# Patient Record
Sex: FEMALE | Employment: UNEMPLOYED | URBAN - METROPOLITAN AREA
[De-identification: names, ages, dates, MRNs, and addresses within clinical notes are randomized per-mention and may not be internally consistent; named-entity substitution may affect disease eponyms.]

---

## 2019-05-27 ENCOUNTER — HOSPITAL ENCOUNTER (INPATIENT)
Facility: CLINIC | Age: 44
LOS: 1 days | Discharge: LEFT BEFORE DISCHARGE | DRG: 607 | End: 2019-05-28
Attending: EMERGENCY MEDICINE | Admitting: INTERNAL MEDICINE
Payer: COMMERCIAL

## 2019-05-27 DIAGNOSIS — D64.9 ANEMIA, UNSPECIFIED TYPE: ICD-10-CM

## 2019-05-27 DIAGNOSIS — L03.315 CELLULITIS OF PERINEUM: ICD-10-CM

## 2019-05-27 DIAGNOSIS — L03.314 CELLULITIS OF GROIN: Primary | ICD-10-CM

## 2019-05-27 DIAGNOSIS — R51.9 ACUTE NONINTRACTABLE HEADACHE, UNSPECIFIED HEADACHE TYPE: ICD-10-CM

## 2019-05-27 LAB
ALBUMIN SERPL-MCNC: 3.6 G/DL (ref 3.4–5)
ALP SERPL-CCNC: 67 U/L (ref 40–150)
ALT SERPL W P-5'-P-CCNC: 52 U/L (ref 0–50)
ANION GAP SERPL CALCULATED.3IONS-SCNC: 5 MMOL/L (ref 3–14)
AST SERPL W P-5'-P-CCNC: 25 U/L (ref 0–45)
BILIRUB SERPL-MCNC: 0.2 MG/DL (ref 0.2–1.3)
BUN SERPL-MCNC: 10 MG/DL (ref 7–30)
CALCIUM SERPL-MCNC: 8.5 MG/DL (ref 8.5–10.1)
CHLORIDE SERPL-SCNC: 107 MMOL/L (ref 94–109)
CO2 SERPL-SCNC: 28 MMOL/L (ref 20–32)
CREAT SERPL-MCNC: 0.59 MG/DL (ref 0.52–1.04)
GFR SERPL CREATININE-BSD FRML MDRD: >90 ML/MIN/{1.73_M2}
GLUCOSE SERPL-MCNC: 71 MG/DL (ref 70–99)
LACTATE BLD-SCNC: 1 MMOL/L (ref 0.7–2)
POTASSIUM SERPL-SCNC: 4 MMOL/L (ref 3.4–5.3)
PROT SERPL-MCNC: 6.8 G/DL (ref 6.8–8.8)
SODIUM SERPL-SCNC: 140 MMOL/L (ref 133–144)

## 2019-05-27 PROCEDURE — 25000128 H RX IP 250 OP 636: Performed by: EMERGENCY MEDICINE

## 2019-05-27 PROCEDURE — 99285 EMERGENCY DEPT VISIT HI MDM: CPT | Mod: 25

## 2019-05-27 PROCEDURE — 96361 HYDRATE IV INFUSION ADD-ON: CPT

## 2019-05-27 PROCEDURE — 80053 COMPREHEN METABOLIC PANEL: CPT | Performed by: EMERGENCY MEDICINE

## 2019-05-27 PROCEDURE — 96375 TX/PRO/DX INJ NEW DRUG ADDON: CPT

## 2019-05-27 PROCEDURE — 84145 PROCALCITONIN (PCT): CPT | Performed by: EMERGENCY MEDICINE

## 2019-05-27 PROCEDURE — 51798 US URINE CAPACITY MEASURE: CPT

## 2019-05-27 PROCEDURE — 83605 ASSAY OF LACTIC ACID: CPT | Performed by: EMERGENCY MEDICINE

## 2019-05-27 PROCEDURE — 85025 COMPLETE CBC W/AUTO DIFF WBC: CPT | Performed by: EMERGENCY MEDICINE

## 2019-05-27 RX ORDER — PIPERACILLIN SODIUM, TAZOBACTAM SODIUM 4; .5 G/20ML; G/20ML
4.5 INJECTION, POWDER, LYOPHILIZED, FOR SOLUTION INTRAVENOUS ONCE
Status: COMPLETED | OUTPATIENT
Start: 2019-05-27 | End: 2019-05-28

## 2019-05-27 RX ORDER — MORPHINE SULFATE 4 MG/ML
4 INJECTION, SOLUTION INTRAMUSCULAR; INTRAVENOUS ONCE
Status: COMPLETED | OUTPATIENT
Start: 2019-05-27 | End: 2019-05-27

## 2019-05-27 RX ORDER — SODIUM CHLORIDE 9 MG/ML
1000 INJECTION, SOLUTION INTRAVENOUS CONTINUOUS
Status: DISCONTINUED | OUTPATIENT
Start: 2019-05-27 | End: 2019-05-28

## 2019-05-27 RX ADMIN — MORPHINE SULFATE 4 MG: 4 INJECTION INTRAVENOUS at 23:29

## 2019-05-27 RX ADMIN — SODIUM CHLORIDE 1000 ML: 9 INJECTION, SOLUTION INTRAVENOUS at 23:09

## 2019-05-27 ASSESSMENT — ENCOUNTER SYMPTOMS
CHILLS: 0
VOMITING: 0
FEVER: 0
ABDOMINAL PAIN: 0
COLOR CHANGE: 1
MYALGIAS: 0
NAUSEA: 0

## 2019-05-27 ASSESSMENT — MIFFLIN-ST. JEOR: SCORE: 1227.41

## 2019-05-27 NOTE — LETTER
May 28, 2019      To Whom It May Concern:      Thuy Olvera was seen in our Emergency Department today, 05/28/19. She is admitted to the hospital for further evaluation and treatment of her condition. Please excuse her from her missed court date.    Sincerely,        Jaziel Bravo MD

## 2019-05-28 ENCOUNTER — APPOINTMENT (OUTPATIENT)
Dept: CT IMAGING | Facility: CLINIC | Age: 44
DRG: 607 | End: 2019-05-28
Attending: EMERGENCY MEDICINE
Payer: COMMERCIAL

## 2019-05-28 VITALS
RESPIRATION RATE: 16 BRPM | WEIGHT: 126 LBS | HEART RATE: 69 BPM | BODY MASS INDEX: 20.99 KG/M2 | OXYGEN SATURATION: 100 % | HEIGHT: 65 IN | TEMPERATURE: 96.1 F | DIASTOLIC BLOOD PRESSURE: 58 MMHG | SYSTOLIC BLOOD PRESSURE: 101 MMHG

## 2019-05-28 PROBLEM — L03.90 CELLULITIS: Status: ACTIVE | Noted: 2019-05-28

## 2019-05-28 LAB
ALBUMIN UR-MCNC: NEGATIVE MG/DL
APPEARANCE UR: CLEAR
BASOPHILS # BLD AUTO: 0 10E9/L (ref 0–0.2)
BASOPHILS NFR BLD AUTO: 0 %
BILIRUB UR QL STRIP: NEGATIVE
COLOR UR AUTO: NORMAL
DIFFERENTIAL METHOD BLD: ABNORMAL
ELLIPTOCYTES BLD QL SMEAR: SLIGHT
EOSINOPHIL # BLD AUTO: 0.2 10E9/L (ref 0–0.7)
EOSINOPHIL NFR BLD AUTO: 3 %
ERYTHROCYTE [DISTWIDTH] IN BLOOD BY AUTOMATED COUNT: ABNORMAL % (ref 10–15)
GLUCOSE UR STRIP-MCNC: NEGATIVE MG/DL
HCT VFR BLD AUTO: 28.7 % (ref 35–47)
HGB BLD-MCNC: 8.1 G/DL (ref 11.7–15.7)
HGB UR QL STRIP: NEGATIVE
KETONES UR STRIP-MCNC: NEGATIVE MG/DL
LEUKOCYTE ESTERASE UR QL STRIP: NEGATIVE
LYMPHOCYTES # BLD AUTO: 1.5 10E9/L (ref 0.8–5.3)
LYMPHOCYTES NFR BLD AUTO: 22 %
MACROCYTES BLD QL SMEAR: PRESENT
MCH RBC QN AUTO: 17.5 PG (ref 26.5–33)
MCHC RBC AUTO-ENTMCNC: 28.2 G/DL (ref 31.5–36.5)
MCV RBC AUTO: 62 FL (ref 78–100)
MONOCYTES # BLD AUTO: 0.5 10E9/L (ref 0–1.3)
MONOCYTES NFR BLD AUTO: 7 %
NEUTROPHILS # BLD AUTO: 4.6 10E9/L (ref 1.6–8.3)
NEUTROPHILS NFR BLD AUTO: 68 %
NITRATE UR QL: NEGATIVE
PH UR STRIP: 6.5 PH (ref 5–7)
PLATELET # BLD AUTO: 339 10E9/L (ref 150–450)
PLATELET # BLD EST: ABNORMAL 10*3/UL
PROCALCITONIN SERPL-MCNC: <0.05 NG/ML
RBC # BLD AUTO: 4.63 10E12/L (ref 3.8–5.2)
RBC #/AREA URNS AUTO: <1 /HPF (ref 0–2)
RBC INCLUSIONS BLD: SLIGHT
SOURCE: NORMAL
SP GR UR STRIP: 1 (ref 1–1.03)
SQUAMOUS #/AREA URNS AUTO: <1 /HPF (ref 0–1)
TARGETS BLD QL SMEAR: ABNORMAL
UROBILINOGEN UR STRIP-MCNC: NORMAL MG/DL (ref 0–2)
WBC # BLD AUTO: 6.8 10E9/L (ref 4–11)
WBC #/AREA URNS AUTO: <1 /HPF (ref 0–5)

## 2019-05-28 PROCEDURE — 25000128 H RX IP 250 OP 636: Performed by: EMERGENCY MEDICINE

## 2019-05-28 PROCEDURE — 25000132 ZZH RX MED GY IP 250 OP 250 PS 637: Performed by: INTERNAL MEDICINE

## 2019-05-28 PROCEDURE — 96365 THER/PROPH/DIAG IV INF INIT: CPT | Mod: 59

## 2019-05-28 PROCEDURE — 99207 ZZC APP CREDIT; MD BILLING SHARED VISIT: CPT | Performed by: INTERNAL MEDICINE

## 2019-05-28 PROCEDURE — 25000128 H RX IP 250 OP 636: Performed by: INTERNAL MEDICINE

## 2019-05-28 PROCEDURE — 81001 URINALYSIS AUTO W/SCOPE: CPT | Performed by: EMERGENCY MEDICINE

## 2019-05-28 PROCEDURE — 25000132 ZZH RX MED GY IP 250 OP 250 PS 637: Performed by: EMERGENCY MEDICINE

## 2019-05-28 PROCEDURE — 12000000 ZZH R&B MED SURG/OB

## 2019-05-28 PROCEDURE — 96375 TX/PRO/DX INJ NEW DRUG ADDON: CPT

## 2019-05-28 PROCEDURE — 25800030 ZZH RX IP 258 OP 636: Performed by: INTERNAL MEDICINE

## 2019-05-28 PROCEDURE — 74177 CT ABD & PELVIS W/CONTRAST: CPT

## 2019-05-28 PROCEDURE — 99207 ZZC CDG-CODE CATEGORY CHANGED: CPT | Performed by: INTERNAL MEDICINE

## 2019-05-28 PROCEDURE — 99235 HOSP IP/OBS SAME DATE MOD 70: CPT | Mod: AI | Performed by: INTERNAL MEDICINE

## 2019-05-28 PROCEDURE — G0463 HOSPITAL OUTPT CLINIC VISIT: HCPCS

## 2019-05-28 PROCEDURE — 25000125 ZZHC RX 250: Performed by: EMERGENCY MEDICINE

## 2019-05-28 RX ORDER — ESCITALOPRAM OXALATE 10 MG/1
10 TABLET ORAL 2 TIMES DAILY
Status: DISCONTINUED | OUTPATIENT
Start: 2019-05-28 | End: 2019-05-28 | Stop reason: HOSPADM

## 2019-05-28 RX ORDER — CEPHALEXIN 500 MG/1
500 CAPSULE ORAL EVERY 6 HOURS
Qty: 12 CAPSULE | Refills: 0 | Status: SHIPPED | OUTPATIENT
Start: 2019-05-28 | End: 2019-05-31

## 2019-05-28 RX ORDER — IBUPROFEN 200 MG
CAPSULE ORAL 2 TIMES DAILY PRN
Status: ON HOLD | COMMUNITY
End: 2019-05-28

## 2019-05-28 RX ORDER — ACETAMINOPHEN 325 MG/1
325-650 TABLET ORAL EVERY 6 HOURS PRN
COMMUNITY

## 2019-05-28 RX ORDER — CEFAZOLIN SODIUM 2 G/100ML
2 INJECTION, SOLUTION INTRAVENOUS EVERY 8 HOURS
Status: DISCONTINUED | OUTPATIENT
Start: 2019-05-28 | End: 2019-05-28

## 2019-05-28 RX ORDER — POLYETHYLENE GLYCOL 3350 17 G/17G
17 POWDER, FOR SOLUTION ORAL DAILY PRN
Status: DISCONTINUED | OUTPATIENT
Start: 2019-05-28 | End: 2019-05-28 | Stop reason: HOSPADM

## 2019-05-28 RX ORDER — ACETAMINOPHEN 325 MG/1
650 TABLET ORAL EVERY 4 HOURS PRN
Status: DISCONTINUED | OUTPATIENT
Start: 2019-05-28 | End: 2019-05-28 | Stop reason: HOSPADM

## 2019-05-28 RX ORDER — LANOLIN ALCOHOL/MO/W.PET/CERES
400 CREAM (GRAM) TOPICAL DAILY
Status: DISCONTINUED | OUTPATIENT
Start: 2019-05-28 | End: 2019-05-28 | Stop reason: HOSPADM

## 2019-05-28 RX ORDER — GABAPENTIN 300 MG/1
300 CAPSULE ORAL 2 TIMES DAILY
COMMUNITY

## 2019-05-28 RX ORDER — GABAPENTIN 300 MG/1
300 CAPSULE ORAL 2 TIMES DAILY
Status: DISCONTINUED | OUTPATIENT
Start: 2019-05-28 | End: 2019-05-28 | Stop reason: HOSPADM

## 2019-05-28 RX ORDER — BENZOCAINE/MENTHOL 6 MG-10 MG
LOZENGE MUCOUS MEMBRANE 2 TIMES DAILY
Qty: 120 G | Refills: 0 | Status: SHIPPED | OUTPATIENT
Start: 2019-05-28

## 2019-05-28 RX ORDER — ZONISAMIDE 100 MG/1
100 CAPSULE ORAL 2 TIMES DAILY
Status: DISCONTINUED | OUTPATIENT
Start: 2019-05-28 | End: 2019-05-28 | Stop reason: HOSPADM

## 2019-05-28 RX ORDER — CEPHALEXIN 500 MG/1
500 CAPSULE ORAL EVERY 6 HOURS SCHEDULED
Status: DISCONTINUED | OUTPATIENT
Start: 2019-05-28 | End: 2019-05-28 | Stop reason: HOSPADM

## 2019-05-28 RX ORDER — METOCLOPRAMIDE HYDROCHLORIDE 5 MG/ML
10 INJECTION INTRAMUSCULAR; INTRAVENOUS ONCE
Status: COMPLETED | OUTPATIENT
Start: 2019-05-28 | End: 2019-05-28

## 2019-05-28 RX ORDER — IOPAMIDOL 755 MG/ML
64 INJECTION, SOLUTION INTRAVASCULAR ONCE
Status: COMPLETED | OUTPATIENT
Start: 2019-05-28 | End: 2019-05-28

## 2019-05-28 RX ORDER — DIPHENHYDRAMINE HYDROCHLORIDE 50 MG/ML
25 INJECTION INTRAMUSCULAR; INTRAVENOUS ONCE
Status: COMPLETED | OUTPATIENT
Start: 2019-05-28 | End: 2019-05-28

## 2019-05-28 RX ORDER — ROSUVASTATIN CALCIUM 5 MG/1
5 TABLET, COATED ORAL AT BEDTIME
COMMUNITY

## 2019-05-28 RX ORDER — RIZATRIPTAN BENZOATE 5 MG/1
5 TABLET ORAL
COMMUNITY

## 2019-05-28 RX ORDER — ZONISAMIDE 100 MG/1
CAPSULE ORAL 2 TIMES DAILY
COMMUNITY

## 2019-05-28 RX ORDER — ONDANSETRON 2 MG/ML
4 INJECTION INTRAMUSCULAR; INTRAVENOUS EVERY 6 HOURS PRN
Status: DISCONTINUED | OUTPATIENT
Start: 2019-05-28 | End: 2019-05-28 | Stop reason: HOSPADM

## 2019-05-28 RX ORDER — POLYETHYLENE GLYCOL 3350 17 G/17G
1 POWDER, FOR SOLUTION ORAL 2 TIMES DAILY PRN
COMMUNITY

## 2019-05-28 RX ORDER — CYANOCOBALAMIN 1000 UG/ML
1 INJECTION, SOLUTION INTRAMUSCULAR; SUBCUTANEOUS
COMMUNITY

## 2019-05-28 RX ORDER — HYDROCODONE BITARTRATE AND ACETAMINOPHEN 5; 325 MG/1; MG/1
2 TABLET ORAL ONCE
Status: COMPLETED | OUTPATIENT
Start: 2019-05-28 | End: 2019-05-28

## 2019-05-28 RX ORDER — TRAZODONE HYDROCHLORIDE 50 MG/1
150 TABLET, FILM COATED ORAL AT BEDTIME
Status: CANCELLED | OUTPATIENT
Start: 2019-05-28

## 2019-05-28 RX ORDER — LAMOTRIGINE 25 MG/1
25 TABLET ORAL 2 TIMES DAILY
Status: CANCELLED | OUTPATIENT
Start: 2019-05-28

## 2019-05-28 RX ORDER — BISACODYL 10 MG
10 SUPPOSITORY, RECTAL RECTAL DAILY PRN
Status: DISCONTINUED | OUTPATIENT
Start: 2019-05-28 | End: 2019-05-28 | Stop reason: HOSPADM

## 2019-05-28 RX ORDER — LACTOBACILLUS RHAMNOSUS GG 10B CELL
1 CAPSULE ORAL 2 TIMES DAILY
Status: DISCONTINUED | OUTPATIENT
Start: 2019-05-28 | End: 2019-05-28 | Stop reason: HOSPADM

## 2019-05-28 RX ORDER — ZOLPIDEM TARTRATE 5 MG/1
5 TABLET ORAL
Status: CANCELLED | OUTPATIENT
Start: 2019-05-28

## 2019-05-28 RX ORDER — ESCITALOPRAM OXALATE 10 MG/1
10 TABLET ORAL 2 TIMES DAILY
COMMUNITY

## 2019-05-28 RX ORDER — QUETIAPINE FUMARATE 25 MG/1
6.25 TABLET, FILM COATED ORAL 2 TIMES DAILY
COMMUNITY

## 2019-05-28 RX ORDER — CLOTRIMAZOLE 1 %
CREAM (GRAM) TOPICAL 2 TIMES DAILY
Status: DISCONTINUED | OUTPATIENT
Start: 2019-05-28 | End: 2019-05-28 | Stop reason: HOSPADM

## 2019-05-28 RX ORDER — POLYETHYLENE GLYCOL 3350 17 G/17G
17 POWDER, FOR SOLUTION ORAL DAILY
Status: DISCONTINUED | OUTPATIENT
Start: 2019-05-28 | End: 2019-05-28 | Stop reason: HOSPADM

## 2019-05-28 RX ORDER — KETOCONAZOLE 20 MG/ML
SHAMPOO TOPICAL
COMMUNITY

## 2019-05-28 RX ORDER — PROCHLORPERAZINE 25 MG
25 SUPPOSITORY, RECTAL RECTAL EVERY 12 HOURS PRN
Status: DISCONTINUED | OUTPATIENT
Start: 2019-05-28 | End: 2019-05-28

## 2019-05-28 RX ORDER — LANOLIN ALCOHOL/MO/W.PET/CERES
400 CREAM (GRAM) TOPICAL DAILY
COMMUNITY

## 2019-05-28 RX ORDER — ONDANSETRON 4 MG/1
4 TABLET, ORALLY DISINTEGRATING ORAL EVERY 6 HOURS PRN
Status: DISCONTINUED | OUTPATIENT
Start: 2019-05-28 | End: 2019-05-28 | Stop reason: HOSPADM

## 2019-05-28 RX ORDER — RIZATRIPTAN BENZOATE 5 MG/1
5 TABLET ORAL
Status: DISCONTINUED | OUTPATIENT
Start: 2019-05-28 | End: 2019-05-28 | Stop reason: HOSPADM

## 2019-05-28 RX ORDER — AMOXICILLIN 250 MG
1-2 CAPSULE ORAL 2 TIMES DAILY
Status: DISCONTINUED | OUTPATIENT
Start: 2019-05-28 | End: 2019-05-28 | Stop reason: HOSPADM

## 2019-05-28 RX ORDER — SODIUM CHLORIDE 9 MG/ML
INJECTION, SOLUTION INTRAVENOUS CONTINUOUS
Status: DISCONTINUED | OUTPATIENT
Start: 2019-05-28 | End: 2019-05-28 | Stop reason: HOSPADM

## 2019-05-28 RX ORDER — QUETIAPINE 200 MG/1
200 TABLET, FILM COATED, EXTENDED RELEASE ORAL AT BEDTIME
COMMUNITY

## 2019-05-28 RX ORDER — CARBIDOPA AND LEVODOPA 25; 100 MG/1; MG/1
0.5 TABLET ORAL 2 TIMES DAILY
COMMUNITY

## 2019-05-28 RX ORDER — QUETIAPINE 200 MG/1
200 TABLET, FILM COATED, EXTENDED RELEASE ORAL AT BEDTIME
Status: DISCONTINUED | OUTPATIENT
Start: 2019-05-28 | End: 2019-05-28 | Stop reason: HOSPADM

## 2019-05-28 RX ORDER — GABAPENTIN 400 MG/1
400 CAPSULE ORAL
Status: CANCELLED | OUTPATIENT
Start: 2019-05-28

## 2019-05-28 RX ORDER — PROCHLORPERAZINE MALEATE 10 MG
10 TABLET ORAL EVERY 6 HOURS PRN
Status: DISCONTINUED | OUTPATIENT
Start: 2019-05-28 | End: 2019-05-28

## 2019-05-28 RX ORDER — HYDROCODONE BITARTRATE AND ACETAMINOPHEN 5; 325 MG/1; MG/1
1 TABLET ORAL EVERY 6 HOURS PRN
Status: DISCONTINUED | OUTPATIENT
Start: 2019-05-28 | End: 2019-05-28 | Stop reason: HOSPADM

## 2019-05-28 RX ORDER — RIZATRIPTAN BENZOATE 5 MG/1
5 TABLET ORAL
Status: DISCONTINUED | OUTPATIENT
Start: 2019-05-28 | End: 2019-05-28 | Stop reason: DRUGHIGH

## 2019-05-28 RX ORDER — NALOXONE HYDROCHLORIDE 0.4 MG/ML
.1-.4 INJECTION, SOLUTION INTRAMUSCULAR; INTRAVENOUS; SUBCUTANEOUS
Status: DISCONTINUED | OUTPATIENT
Start: 2019-05-28 | End: 2019-05-28 | Stop reason: HOSPADM

## 2019-05-28 RX ORDER — GABAPENTIN 600 MG/1
1200 TABLET ORAL 2 TIMES DAILY
Status: CANCELLED | OUTPATIENT
Start: 2019-05-28

## 2019-05-28 RX ORDER — ROSUVASTATIN CALCIUM 5 MG/1
5 TABLET, COATED ORAL AT BEDTIME
Status: DISCONTINUED | OUTPATIENT
Start: 2019-05-28 | End: 2019-05-28 | Stop reason: HOSPADM

## 2019-05-28 RX ORDER — CLOTRIMAZOLE 1 %
CREAM (GRAM) TOPICAL 2 TIMES DAILY
Qty: 60 G | Refills: 0 | Status: SHIPPED | OUTPATIENT
Start: 2019-05-28

## 2019-05-28 RX ADMIN — SODIUM CHLORIDE 60 ML: 9 INJECTION, SOLUTION INTRAVENOUS at 00:16

## 2019-05-28 RX ADMIN — SODIUM CHLORIDE: 9 INJECTION, SOLUTION INTRAVENOUS at 03:40

## 2019-05-28 RX ADMIN — ESCITALOPRAM OXALATE 10 MG: 10 TABLET ORAL at 10:08

## 2019-05-28 RX ADMIN — METFORMIN HYDROCHLORIDE 500 MG: 500 TABLET, FILM COATED ORAL at 09:55

## 2019-05-28 RX ADMIN — Medication 6.25 MG: at 12:36

## 2019-05-28 RX ADMIN — IOPAMIDOL 64 ML: 755 INJECTION, SOLUTION INTRAVENOUS at 00:16

## 2019-05-28 RX ADMIN — POLYETHYLENE GLYCOL 3350 17 G: 17 POWDER, FOR SOLUTION ORAL at 03:39

## 2019-05-28 RX ADMIN — DIPHENHYDRAMINE HYDROCHLORIDE 25 MG: 50 INJECTION INTRAMUSCULAR; INTRAVENOUS at 02:27

## 2019-05-28 RX ADMIN — Medication 10 MG: at 03:38

## 2019-05-28 RX ADMIN — Medication 6.25 MG: at 10:07

## 2019-05-28 RX ADMIN — ZONISAMIDE 100 MG: 100 CAPSULE ORAL at 10:08

## 2019-05-28 RX ADMIN — PIPERACILLIN SODIUM,TAZOBACTAM SODIUM 4.5 G: 4; .5 INJECTION, POWDER, FOR SOLUTION INTRAVENOUS at 01:15

## 2019-05-28 RX ADMIN — Medication 400 MCG: at 10:08

## 2019-05-28 RX ADMIN — HYDROCODONE BITARTRATE AND ACETAMINOPHEN 2 TABLET: 5; 325 TABLET ORAL at 02:26

## 2019-05-28 RX ADMIN — CARBIDOPA AND LEVODOPA 1 HALF-TAB: 25; 100 TABLET ORAL at 11:10

## 2019-05-28 RX ADMIN — ACETAMINOPHEN 650 MG: 325 TABLET, FILM COATED ORAL at 07:59

## 2019-05-28 RX ADMIN — GABAPENTIN 300 MG: 300 CAPSULE ORAL at 09:55

## 2019-05-28 RX ADMIN — CEPHALEXIN 500 MG: 500 CAPSULE ORAL at 12:36

## 2019-05-28 RX ADMIN — CEFAZOLIN SODIUM 2 G: 2 INJECTION, SOLUTION INTRAVENOUS at 03:40

## 2019-05-28 RX ADMIN — RIZATRIPTAN 5 MG: 5 TABLET, FILM COATED ORAL at 11:14

## 2019-05-28 RX ADMIN — METOCLOPRAMIDE 10 MG: 5 INJECTION, SOLUTION INTRAMUSCULAR; INTRAVENOUS at 02:27

## 2019-05-28 RX ADMIN — Medication 1 CAPSULE: at 11:10

## 2019-05-28 ASSESSMENT — ACTIVITIES OF DAILY LIVING (ADL)
ADLS_ACUITY_SCORE: 13
ADLS_ACUITY_SCORE: 11
ADLS_ACUITY_SCORE: 14

## 2019-05-28 NOTE — PROGRESS NOTES
RECEIVING UNIT ED HANDOFF REVIEW    ED Nurse Handoff Report was reviewed by: Caty Aguilar on May 28, 2019 at 2:34 AM

## 2019-05-28 NOTE — PHARMACY-ADMISSION MEDICATION HISTORY
Admission medication history interview status for the 5/27/2019  admission is complete. See EPIC admission navigator for prior to admission medications     Medication history source reliability:Moderate    Actions taken by pharmacist (provider contacted, etc):interviewed patient, checked Care Everywhere, called [facility]--where pt states she gets her meds (but they only had diabetic testing supplies on their records)     Additional medication history information not noted on PTA med list :maxalt and zonisamide doses can't be confirmed, but best esitmate is 5mg maxalt and 100mg bid zonisamide    Medication reconciliation/reorder completed by provider prior to medication history? No    Time spent in this activity: 30 minutes    Prior to Admission medications    Medication Sig Last Dose Taking? Auth Provider   acetaminophen (TYLENOL) 325 MG tablet Take 325-650 mg by mouth every 6 hours as needed for mild pain prn Yes Unknown, Entered By History   calcium carbonate 600 mg-vitamin D 400 units (CALTRATE) 600-400 MG-UNIT per tablet Take 1 tablet by mouth daily 5/27/2019 at Unknown time Yes Unknown, Entered By History   carbidopa-levodopa (SINEMET)  MG tablet Take 0.5 tablets by mouth 2 times daily 5/27/2019 at Unknown time Yes Unknown, Entered By History   coal tar-salicylic acid (IONIL-T) 1 % external shampoo Apply topically twice a week Alternate with nizoral shampoo 5/27/2019 at Unknown time Yes Unknown, Entered By History   cyanocobalamin (CYANOCOBALAMIN) 1000 MCG/ML injection Inject 1 mL into the muscle Daily X 7 days, then weekly 5/27/2019 at Unknown time Yes Unknown, Entered By History   Emollient (CERAVE) CREA Externally apply topically 4 times daily as needed Barrier cream, to face Past Week at Unknown time Yes Unknown, Entered By History   escitalopram (LEXAPRO) 10 MG tablet Take 10 mg by mouth 2 times daily 5/27/2019 at Unknown time Yes Unknown, Entered By History   folic acid (FOLVITE) 400 MCG tablet Take 400  mcg by mouth daily 5/27/2019 at Unknown time Yes Unknown, Entered By History   gabapentin (NEURONTIN) 300 MG capsule Take 300 mg by mouth 2 times daily 5/27/2019 at Unknown time Yes Unknown, Entered By History   ketoconazole (NIZORAL) 2 % external shampoo Apply topically twice a week Alternate with coal tar shampoo Past Week at Unknown time Yes Unknown, Entered By History   metFORMIN (GLUCOPHAGE) 500 MG tablet Take 500 mg by mouth 2 times daily (with meals) 5/27/2019 at Unknown time Yes Unknown, Entered By History   Neomycin-Bacitracin-Polymyxin (TRIPLE ANTIBIOTIC) 3.5-400-5000 OINT ointment Externally apply topically 2 times daily as needed 5/27/2019 at Unknown time Yes Unknown, Entered By History   polyethylene glycol (MIRALAX/GLYCOLAX) packet Take 1 packet by mouth 2 times daily as needed for constipation 5/27/2019 at Unknown time Yes Unknown, Entered By History   QUEtiapine (SEROQUEL) 25 MG tablet Take 6.25 mg by mouth 2 times daily AM and noon 5/27/2019 at noon Yes Unknown, Entered By History   QUEtiapine ER (SEROQUEL XR) 200 MG 24 hr tablet Take 200 mg by mouth At Bedtime 5/27/2019 at Unknown time Yes Unknown, Entered By History   rizatriptan (MAXALT) 5 MG tablet Take 5 mg by mouth at onset of headache for migraine (may repeat X1) prn Yes Unknown, Entered By History   rosuvastatin (CRESTOR) 5 MG tablet Take 5 mg by mouth At Bedtime 5/27/2019 at Unknown time Yes Unknown, Entered By History   zonisamide (ZONEGRAN) 100 MG capsule Take by mouth 2 times daily  5/27/2019 at Unknown time Yes Unknown, Entered By History

## 2019-05-28 NOTE — ED NOTES
"[facility]  ED Nurse Handoff Report    ED Chief complaint: Rash      ED Diagnosis:   Final diagnoses:   Cellulitis of perineum   Anemia, unspecified type   Acute nonintractable headache, unspecified headache type       Code Status: Full Code    Allergies:   Allergies   Allergen Reactions     Nsaids      Had a gastric bypass, told not to take any nsaids.     Tramadol Other (See Comments)     Seizures       Activity level - Baseline/Home:  Independent    Activity Level - Current:   Stand with Assist     Needed?: No    Isolation: No  Infection: Not Applicable  Bariatric?: No    Vital Signs:   Vitals:    05/27/19 2230 05/27/19 2335   BP: (!) 86/44 97/77   Pulse:  69   Resp: 16    Temp: 97.5  F (36.4  C)    TempSrc: Oral    SpO2: 100%    Weight: 57.2 kg (126 lb)    Height: 1.651 m (5' 5\")        Cardiac Rhythm: ,        Pain level: 0-10 Pain Scale: 8    Is this patient confused?: No   Does this patient have a guardian?  No         If yes, is there guardianship documents in the Epic \"Code/ACP\" activity?  N/A         Guardian Notified?  N/A  [place] - Suicide Severity Rating Scale Completed?  Yes  If yes, what color did the patient score?  White    Patient Report: Initial Complaint: rash  Focused Assessment: pt has rash that started on inner legs a few days ago, rash has been gradually spreading upwards onto abdomen. Pt is very uncomfortable and itchy with rash. Pt does have a large amount of stool in colon as well. Pt to be admitted for antibiotics.   Tests Performed: CT, labs  Abnormal Results:   Labs Ordered and Resulted from Time of ED Arrival Up to the Time of Departure from the ED   CBC WITH PLATELETS DIFFERENTIAL - Abnormal; Notable for the following components:       Result Value    Hemoglobin 8.1 (*)     Hematocrit 28.7 (*)     MCV 62 (*)     MCH 17.5 (*)     MCHC 28.2 (*)     All other components within normal limits   COMPREHENSIVE METABOLIC PANEL - Abnormal; Notable for the " following components:    ALT 52 (*)     All other components within normal limits   LACTIC ACID WHOLE BLOOD   ROUTINE UA WITH MICROSCOPIC   PROCALCITONIN   PULSE OXIMETRY NURSING   CARDIAC CONTINUOUS MONITORING   PATIENT CARE ORDER       Treatments provided: fluids, zosyn    Family Comments: mother at bedside    OBS brochure/video discussed/provided to patient/family: N/A              Name of person given brochure if not patient: NA              Relationship to patient: NA    ED Medications:   Medications   0.9% sodium chloride BOLUS (0 mLs Intravenous Stopped 5/28/19 0157)     Followed by   sodium chloride 0.9% infusion ( Intravenous Canceled Entry 5/28/19 0157)   iohexol (OMNIPAQUE) solution 25 mL ( Oral Canceled Entry 5/28/19 0157)   metoclopramide (REGLAN) injection 10 mg (has no administration in time range)   diphenhydrAMINE (BENADRYL) injection 25 mg (has no administration in time range)   HYDROcodone-acetaminophen (NORCO) 5-325 MG per tablet 2 tablet (has no administration in time range)   piperacillin-tazobactam (ZOSYN) 4.5 g vial to attach to  mL bag (0 g Intravenous Stopped 5/28/19 0157)   morphine (PF) injection 4 mg (4 mg Intravenous Given 5/27/19 2329)   iopamidol (ISOVUE-370) solution 64 mL (64 mLs Intravenous Given 5/28/19 0016)   CT scan flush (60 mLs Intravenous Given 5/28/19 0016)       Drips infusing?:  No    For the majority of the shift this patient was Green.   Interventions performed were none.    Severe Sepsis OR Septic Shock Diagnosis Present: No    To be done/followed up on inpatient unit:  fluids    ED NURSE PHONE NUMBER: *[id number]

## 2019-05-28 NOTE — ED TRIAGE NOTES
Patient states she has a painful rash in her groin and stomach and that she may have cellulitis. BP 86/44 in triage.

## 2019-05-28 NOTE — PROGRESS NOTES
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      Jay Risk Assessment  Sensory Perception: 4-->no impairment    Moisture: 4-->rarely moist   Activity: 3-->walks occasionally     Mobility: 3-->slightly limited   Nutrition: 3-->adequate   Jay Score: 20      Moisture Management:  toilets      Current Diet / Nutrition:       Orders Placed This Encounter        Diet        Labs:   Recent Labs   Lab Test 05/27/19  2307   ALBUMIN 3.6   HGB 8.1*   WBC 6.8         Skin Assessment (location):   Perineal/pubic area  History:  Unclear history, but per pt is improved a lot from yesterday.     Skin: Skin is mostly intact, except for some superficial linear denudement to inner right thigh/perineal area which is pink and slightly moist.  Scattered redness and edema to lower pannus, vulva, inner thighs/buttock folds.  Coccyx and upper buttocks are clear/intact.      Color: pink    Temperature  warm    Drainage:  Scant serous    Odor: none    Pain: very tender in areas, mostly along lateral edges of labia to inner thighs.  Denies itching.      5-28-19                Intervention:     Patient's chart evaluated.      Assessed: skin    Orders  Written    Supplies  reviewed and gathered    Discussed plan of care with Patient, Family and Nurse          All patient / family questions answered:  YES        Assessment:        Erythema and edema to pubic area of unclear etiology; likely dermatitis per ID.  Minimal open tissue.  No s/s fungal infection.  Will give recs for general skin care to area.            Plan:   Nursing to notify the  Provider(s) and re-consult the WO Nurse if skin deteriorate(s).      Skin care plan to pubic/perineal area: BID and prn:  1.  Cleanse general skin and skin folds with mild ph-balanced cleanser, ie Swati perineal lotion.   2.  May clean any open areas with MicroKlenz.   3.  Apply any prescription ointments per MD orders prn.   4.  Apply very thin glaze of barrier paste to denuded/raw areas and leave open to air.    5.  Maximize air flow to area - avoid sitting with legs crossed for long periods, no synthetic underwear, no dressings, etc.      WO Nurse will return: weekly and prn

## 2019-05-28 NOTE — PLAN OF CARE
A & O x 4, VSS, no c/o pain. Up w/SBA to void in BR, c/o constipation, gave prn miralax and suppository with some results, BS hypoactive and pt notes passing flatus. R AC PIV w/fluids running and IV ABX. Redness and +3 edema to groin, perineum and labia. Plan for ID to see pt.

## 2019-05-28 NOTE — DISCHARGE SUMMARY
Patient discharged AMA at 12:30 PM to court hearing.  IV was discontinued. Pain at time of discharge was 3/10 but pt declined pain interventions prior to leaving. Belongings returned to patient. Education on risks of leaving AMA discussed with patient and form was signed in presence of mother.  Patient verbalized understanding of risks and all questions were answered.  Prescriptions to be given to patient later this evening after patient returns from court to pick them up. At time of discharge, patient condition was stable and left the unit on foot escorted by mother.

## 2019-05-28 NOTE — ED PROVIDER NOTES
History     Chief Complaint:  Rash     HPI   Thuy LASHA Olvera is a 43 year old female with a history of diabetes, seizures, traumatic brain injury, and anemia who presents with a female  to the Emergency Department today for evaluation of rash. Patient reports rash started on her upper inner thighs and felt like chafing which then became progressively worse. The skin began to break down and rash spread to her pelvis and around her umbilicus. Area is extremely painful and she reports pain is out of proportion to the redness. No fever, chills, or body aches. No nausea or vomiting. No abdominal pain. She reports there is no chance she has a tampon inside her as she uses pads when she gets her infrequent periods. She used an antibiotic cream with no alleviation of her symptoms. Her sugars have been pretty good recently.     Allergies:  Nsaids  Tramadol: seizures    Prednisone    Medications:    Gabapentin  Lamictal  Metformin  Seroquel  Senna  Zoloft  Desyrel  Ambien   Valtrex  Crestor  Zonegram  Lexapro  Sinemet  Vitamin B-12    Past Medical History:    Suicide attempt  Suicidal ideation  Depression  Anxiety  Diabetes  Traumatic brain injury  Hyperlipidemia  Seizures  B12 deficiency  Constipation  Anemia  Hypothyroidism  GERD  Menorrhagia  Asthma  Kidney stones  TB  Nephrolithiasis  Migraines  Fibroids  Hidradenitis  Malnutrition  Hypoglycemia  Bulimia  Hepatitis  Overdose  Chronic back pain  UTI  Thalassemia trait    Past Surgical History:    Gastric bypass    section  Lymph node biopsy  Lithotripsy  Endometrial ablation  Tracheostomy    Family History:    History reviewed. No pertinent family history.     Social History:  Smoking status: Never smoker  Alcohol use: No  Marital Status:   [2]     Review of Systems   Constitutional: Negative for chills and fever.   Gastrointestinal: Negative for abdominal pain, nausea and vomiting.   Musculoskeletal: Negative for myalgias.   Skin: Positive for  "color change and rash.   All other systems reviewed and are negative.      Physical Exam     Patient Vitals for the past 24 hrs:   BP Temp Temp src Pulse Heart Rate Resp SpO2 Height Weight   05/27/19 2335 97/77 -- -- 69 -- -- -- -- --   05/27/19 2230 (!) 86/44 97.5  F (36.4  C) Oral -- 73 16 100 % 1.651 m (5' 5\") 57.2 kg (126 lb)       Physical Exam    Constitutional:  Appears well-developed and well-nourished. Cooperative. Looks uncomfortable. Pale. Quiet, halting speech.  HENT:   Head:    Atraumatic.   Mouth/Throat:   Oropharynx is without erythema or exudate and mucous     membranes are moist.   Eyes:    Conjunctivae normal and EOM are normal.      Pupils are equal, round, and reactive to light.   Neck:    Normal range of motion. Neck supple.   Cardiovascular:  Normal rate, regular rhythm, normal heart sounds and radial and dorsalis pedis pulses are 2+ and symmetric.    Pulmonary/Chest:  Effort normal and breath sounds normal.   Abdominal:   Soft. Bowel sounds are normal.      No splenomegaly or hepatomegaly. No tenderness. No rebound.   Musculoskeletal:  Normal range of motion. No edema and no tenderness.   Neurological:  Alert. Normal strength. No cranial nerve deficit.   Skin:    Rash over medial thighs. Area of erythema from medial thighs over mons pubis with patch on lower abdomen. No Bartholin cysts.  Psychiatric:   Normal mood and affect.     Emergency Department Course     Imaging:  Radiographic findings were communicated with the patient and family who voiced understanding of the findings.  CT Abdomen Pelvis w Contrast  IMPRESSION:  1. There is a very large amount of stool throughout the colon. No  bowel obstruction or evidence of bowel inflammation.  2. A few left renal stones including a 2.3 cm renal pelvis stone.  3. Mild bilateral hydronephrosis may be due to the very distended  urinary bladder As read by radiology.    Laboratory:  CBC: HGB 8.1 (L) o/w WNL (WBC 6.8, )  CMP: ALT 52 (H) o/w WNL " (Creatinine 0.59)    2307: Lactic acid whole blood: 1.0    Procalcitonin: <0.05    UA: Negative    Interventions:  2309: NS 1L IV Bolus  2329: Morphine 4 MG IV  2332: Omnipaque 25 mL PO  0115: Zosyn 4.5 G IV  0226: Norco  MG PO  0227: Reglan 10 MG IV  0227: Benadryl 25 MG IV    Emergency Department Course:  Past medical records, nursing notes, and vitals reviewed.  2244: I performed an exam of the patient and obtained history, as documented above.    IV inserted and blood drawn.    The patient was sent for a CT of her abdomen and pelvis while in the emergency department, findings above.    Findings and plan explained to the Patient who consents to admission.     0209: Discussed the patient with Dr. Milner, who will admit the patient to a [facility] bed for further monitoring, evaluation, and treatment.     0218: I performed a chaperoned rectal exam of the patient.    Impression & Plan      Medical Decision Making:  Patient presents complaining of lower abdominal pain along with perineal and buttock pain. She has had rash that developed on her medial thighs a few days ago and has progressively worsened. She said she is not having any fevers but she does have headache, nausea, and feels generally unwell.    On exam, she looks like she has some skin breakdown in the skin folds of both upper thighs. There are no blisters. There is no crepitus. Patient also has diffuse erythema that extends from bilateral medial thighs up over her mons pubis and there is a patch on her left lower abdomen. There is also some erythema on bilateral buttocks and her perirectal skin. There is no visible, palpable masses, no Bartholin cysts.     An IV was established. Lactate was drawn and is negative. White cell count is normal. The patient has known anemia and her hemoglobin is stable at 8.1. I gave her a dose of IV Zosyn to cover for skin and soft tissue infection. I also considered a contact dermatitis, but the patient states there have been  no new soaps, detergents, etc. She did, however, say she has been trying on lots of clothes recently.    There is skin breakdown on the skin folds between her pelvis and thighs bilaterally but there is no blistering. There is something that looks like herpes infection. The patient reports she has not been sexually active in the last couple of years. She said she does not think her ex- has any history of herpes or that she would have had a herpes exposure.    Patient's pain improved somewhat with IV pain medicines. She then developed a headache and was given Norco plus Reglan and benadryl. After the zosyn and benadryl I re-examined her rash. There is definitely no progression of the rash and the erythema actually seems to have faded. She is not showing any signs of sepsis. CT Scan did not show abscess or abnormal soft tissue findings. I doubt necrotizing fasciitis because of the patient's pain and the rapid progression of the erythema, will bring her into the hospital for further evaluation and treatment. I discussed this with the patient and her family and they voice understanding. Dr. Milner of the hospitalist services who has accepted the patient.    Diagnosis:    ICD-10-CM   1. Cellulitis of perineum L03.315   2. Anemia, unspecified type D64.9   3. Acute nonintractable headache, unspecified headache type R51     Disposition:  discharged to home    Allison Henning  5/27/2019    EMERGENCY DEPARTMENT  Scribe Disclosure:  I, Allison Henning, am serving as a scribe at 10:44 PM on 5/27/2019 to document services personally performed by Jaziel Bravo MD based on my observations and the provider's statements to me.        Jaziel Bravo MD  05/28/19 0614

## 2019-05-28 NOTE — H&P
Admitted:     05/27/2019      PRIMARY CARE PHYSICIAN:  Dr. Escoto        CHIEF COMPLAINT:  Painful rash.      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  The patient notes that the last few days started to have a rash develop in her upper inner thighs and noted that it felt like chafing and then became progressively worse.  She notes redness with some skin breakdown in her stomach and right inner thigh region.  She noted pain associated with a rash.  She denies any pruritus.  No fevers or body aches, but does feel cold.  Given her symptomatology, she came to [facility] for further evaluation.      The patient was seen in the ER, her vitals were checked and showed temperature 97.5, heart rate 72, blood pressure 86/44, respiration rate 16, O2 saturations 100% on room air.  Laboratory evaluation showed a normal white count.  Hemoglobin was 8.1 in line with previous hemoglobins.  Lactic acid normal.  Procalcitonin was less than 0.05.  She had a urinalysis which did not suggest infection.  She had CT abdomen and pelvis with contrast showed very large amount of stool throughout the colon without obstruction or evidence of bowel inflammation, a few left renal stones including a 2.3 cm renal pelvis stone, mild bilateral hydronephrosis may be due to very distended urinary bladder.  The patient was given a dose of Zosyn and also given Norco for pain.  Given the significant rash/cellulitis and her symptomatology, request for admission was made.      The patient denies any chest pain or shortness breath.  Does note some nausea and decreased appetite.  No vomiting.  Does note some abdominal discomfort/pain.  She notes chronic constipation, but worse recently.  No bloody stools or diarrhea.  Patient's  mother says that, at the time of my interview, rash has improved.     PAST MEDICAL HISTORY:   1.  Diabetes mellitus type 2, hemoglobin A1c 5.5 in 2019.   2.  Seizure disorder.  3.  Psychiatric:  Depression/anxiety with history also noted of PTSD.  History of multiple psychiatric hospitalizations.  History of multiple suicide attempt/overdose is noted.   4.  Obesity history with prior gastric bypass surgery in .   5.  Nephrolithiasis.  History of prior lithotripsy.   6.  GERD.   7.  L2-L3 spine/disk disease.   8.  Chronic constipation.  Has seen Colorectal surgery for this in the past.   9.  Iron deficiency anemia.  Has received IV iron.  10.  Fibroids.   11.  History of hypothyroidism noted to be autoimmune thyroiditis.   12.  History of tuberculosis at age 25 years old that required 9 months of 4 drug treatment.  There was noted to be mediastinal involvement.   13.  History of significant seizure in 2017 while she was in [place] with anoxic brain injury and prolonged hospital course requiring a tracheostomy one point.  She has recovered fully.      PAST SURGICAL HISTORY:   1.  Status post lymph node biopsy .    2.  Status post  section x 5.   3.  Status post lithotripsy .   4.  Status post gastric bypass in .   5.  Status post tracheostomy in 2017.      ALLERGIES:  NSAIDs AND TRAMADOL.        HOME MEDICATIONS:   Prior to Admission medications    Medication Sig Last Dose Taking? Auth Provider   gabapentin (NEURONTIN) 400 MG capsule Take 1 capsule (400 mg) by mouth daily (with lunch)   Fito Victor MD   GABAPENTIN PO Take 1,200 mg by mouth 2 times daily   Reported, Patient   LamoTRIgine (LAMICTAL PO) Take 25 mg by mouth 2 times daily   Reported, Patient   METFORMIN HCL PO Take 1,000 mg by mouth   Reported, Patient   QUEtiapine (SEROQUEL) 25 MG tablet Take 0.25 tablets (6.25 mg) by mouth 3 times daily   Tres Brock MD   SENNA CO 4 tablets   Reported, Patient   sertraline  (ZOLOFT) 100 MG tablet Take 1 tablet (100 mg) by mouth daily   Migue He MD   traZODone (DESYREL) 150 MG tablet Take 1 tablet (150 mg) by mouth At Bedtime   Fito Victor MD   zolpidem (AMBIEN) 5 MG tablet Take 1 tablet (5 mg) by mouth nightly as needed for sleep   Fito Victor MD      SOCIAL HISTORY:  The patient does not smoke or drink.  She has 1 daughter.  She is originally from [place].  She has been living in [facility] with her mom for the past 2 years.  She is in town presently to finalize a divorce.  She is an Internal Medicine physician.  She previously worked at the [employer], but currently not working.      FAMILY HISTORY:  Reviewed and not found to be contributory.      REVIEW OF SYSTEMS:  As noted in HPI, otherwise points negative.      PHYSICAL EXAMINATION:   VITAL SIGNS:  Temperature 97.5, heart rate 69, blood pressure 97/77, respiratory rate 16, O2 saturations 100% on room air.   GENERAL:  Alert, oriented 43-year-old female patient lying in bed.  She is conversant and friendly.   HEENT:  Pupils equal, round, reactive.  No scleral icterus or conjunctival injection.  Oropharynx:  Slightly dry mucous membranes.   NECK:  No bruits, JVD or adenopathy.   HEART:  Regular rate and rhythm without murmurs, rubs or gallops.   LUNGS:  Grossly clear.   ABDOMEN:  Soft, nontender, nondistended with positive bowel sounds, no femoral bruits.   SKIN:  Inner thigh/groin/intertriginous areas with erythema and some areas of skin breakdown noted in abdominal region, some moistness.      LABORATORY DATA:  BMP was normal.  LFTs showed slightly elevated ALT, otherwise normal.  Lactic acid and procalcitonin were negative.  CBC showed white count 6.8, hemoglobin 8.1, platelets 339, glucose 71.  Urinalysis did not suggest infection.  CT of the abdomen and pelvis with contrast as above.      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      1.  Rash involving lower abdomen, groin/inner thighs and buttock region, possibly cellulitis, fungal rash/dermatitis or contact dermatitis.  The patient was given a dose of Zosyn.  Rash already better, per patient's mother.  We will continue antibiotics which will consist of cefazolin intravenously.  We will order topical 1% Clotrimazole.  Ask ID opinion.  Followup on culture results.    2.  Anemia, chronic.  She has history of iron deficiency and has received IV iron in the past.  Of note, hemoglobin was 6.5 on 2019 before iron infusions.  No signs of any active major bleeding at this time.  Monitor CBC and monitor for any signs of bleeding.  Otherwise, the patient should follow up with primary provider.    3.  Constipation with abdominal pain.  CT abdomen and pelvis as above showed significant amount of stool.  She does feel that she does have issues with constipation chronically.  We will order Bisacodyl suppository.  Will schedule MiraLax and Senokot.  Encouraged ambulation and adequate oral hydration.     4.  Diabetes mellitus type 2 without complications.  Continue metformin.       5.  Seizure disorder.  Continue gabapentin, lamotrigine.      6.  Depression/anxiety.  Continue quetiapine, sertraline and trazodone.     7.  Prophylaxis:  Pneumoboots and ambulation.      CODE STATUS:  Full code.         ADIEL ALEMAN JR., MD             D: 2019   T: 2019   MT: CHIOMA      Name:     FERNANDO BURGESS   MRN:      [id number]        Account:      [id number]   :      1975        Admitted:     2019                   Document: [id number]

## 2019-05-28 NOTE — CONSULTS
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     Impression:  1.  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   2. PMH of diabetes, seizure disorder.   3. History of gastric bypass surgery.   4. Previous history of TB treated for months ( 20 years ago)   5. Admitted with painful, open rash in the inner thigh, lower abdomen and buttocks following the area where the underwear is touching the skin.   6. No fevers or chills.   7. No leucocytosis, normal procalcitonin.     Recommendations:   The rash does not appear to be cellulitis to me, more like a contact dermatitis.   Open areas in the inner thigh so ok do a small course keflex for 3 days. ( day 2 of antibiotics today)   Treatment for contact dermatitis per primary team.     Will sign off please call if questions.     Kay Shanks MD    Reason for Consult   Reason for consult: I was asked by Dr. Milner  to evaluate this patient for groin rash.    Primary Care Physician   Jesús Escoto    Chief Complaint   Redness in the groin, painful     History is obtained from the patient and medical records    History of Present Illness   Thuy Olvera is a 43 year old female with diabetes mellitus type 2,  seizure disorder, depression/anxiety, chronic anemia and prior gastric bypass surgery. She presented with a  rash that develop in her upper inner thighs and noted that it felt like chafing and then became progressively worse.  She notes redness with some skin breakdown in her stomach and right inner thigh region.  She noted pain associated with a rash.  She denies any pruritus.  No fevers or body aches, but does feel cold.      The patient was seen in the ER, her vitals were checked and showed temperature 97.5, heart rate 72, blood pressure 86/44, respiration rate  16, O2 saturations 100% on room air.  Laboratory evaluation showed a normal white count.  Hemoglobin was 8.1 in line with previous hemoglobins.  Lactic acid normal.  Procalcitonin was less than 0.05.  She had a urinalysis which did not suggest infection.  She had CT abdomen and pelvis with contrast showed very large amount of stool throughout the colon without obstruction or evidence of bowel inflammation, a few left renal stones including a 2.3 cm renal pelvis stone, mild bilateral hydronephrosis may be due to very distended urinary bladder.  The patient was given a dose of Zosyn and also given Norco for pain.  Given the significant rash/cellulitis and her symptomatology, request for admission was made.            Past Medical History   I have reviewed this patient's medical history and updated it with pertinent information if needed.   Past Medical History:   Diagnosis Date     Depressive disorder      Type 2 diabetes mellitus without complications (H)        Past Surgical History   I have reviewed this patient's surgical history and updated it with pertinent information if needed.  Past Surgical History:   Procedure Laterality Date     LAPAROSCOPIC BYPASS GASTRIC         Prior to Admission Medications   Prior to Admission Medications   Prescriptions Last Dose Informant Patient Reported? Taking?   Emollient (CERAVE) CREA Past Week at Unknown time Self Yes Yes   Sig: Externally apply topically 4 times daily as needed Barrier cream, to face   Neomycin-Bacitracin-Polymyxin (TRIPLE ANTIBIOTIC) 3.5-400-5000 OINT ointment 5/27/2019 at Unknown time Self Yes Yes   Sig: Externally apply topically 2 times daily as needed   QUEtiapine (SEROQUEL) 25 MG tablet 5/27/2019 at noon Self Yes Yes   Sig: Take 6.25 mg by mouth 2 times daily AM and noon   QUEtiapine ER (SEROQUEL XR) 200 MG 24 hr tablet 5/27/2019 at Unknown time Self Yes Yes   Sig: Take 200 mg by mouth At Bedtime   acetaminophen (TYLENOL) 325 MG tablet prn Self Yes Yes    Sig: Take 325-650 mg by mouth every 6 hours as needed for mild pain   calcium carbonate 600 mg-vitamin D 400 units (CALTRATE) 600-400 MG-UNIT per tablet 5/27/2019 at Unknown time Self Yes Yes   Sig: Take 1 tablet by mouth daily   carbidopa-levodopa (SINEMET)  MG tablet 5/27/2019 at Unknown time Self Yes Yes   Sig: Take 0.5 tablets by mouth 2 times daily   coal tar-salicylic acid (IONIL-T) 1 % external shampoo 5/27/2019 at Unknown time Self Yes Yes   Sig: Apply topically twice a week Alternate with nizoral shampoo   cyanocobalamin (CYANOCOBALAMIN) 1000 MCG/ML injection 5/27/2019 at Unknown time Self Yes Yes   Sig: Inject 1 mL into the muscle Daily X 7 days, then weekly   escitalopram (LEXAPRO) 10 MG tablet 5/27/2019 at Unknown time Self Yes Yes   Sig: Take 10 mg by mouth 2 times daily   folic acid (FOLVITE) 400 MCG tablet 5/27/2019 at Unknown time Self Yes Yes   Sig: Take 400 mcg by mouth daily   gabapentin (NEURONTIN) 300 MG capsule 5/27/2019 at Unknown time Self Yes Yes   Sig: Take 300 mg by mouth 2 times daily   ketoconazole (NIZORAL) 2 % external shampoo Past Week at Unknown time Self Yes Yes   Sig: Apply topically twice a week Alternate with coal tar shampoo   metFORMIN (GLUCOPHAGE) 500 MG tablet 5/27/2019 at Unknown time Self Yes Yes   Sig: Take 500 mg by mouth 2 times daily (with meals)   polyethylene glycol (MIRALAX/GLYCOLAX) packet 5/27/2019 at Unknown time Self Yes Yes   Sig: Take 1 packet by mouth 2 times daily as needed for constipation   rizatriptan (MAXALT) 5 MG tablet prn Self Yes Yes   Sig: Take 5 mg by mouth at onset of headache for migraine (may repeat X1)   rosuvastatin (CRESTOR) 5 MG tablet 5/27/2019 at Unknown time Self Yes Yes   Sig: Take 5 mg by mouth At Bedtime   zonisamide (ZONEGRAN) 100 MG capsule 5/27/2019 at Unknown time Self Yes Yes   Sig: Take by mouth 2 times daily       Facility-Administered Medications: None     Allergies   Allergies   Allergen Reactions     Nsaids      Had a  gastric bypass, told not to take any nsaids.     Tramadol Other (See Comments)     Seizures       Immunization History   Immunization History   Administered Date(s) Administered     HEPA 11/27/2006, 03/15/2010     Poliovirus, inactivated (IPV) 11/27/2006     TDAP Vaccine (Adacel) 11/27/2006     Typhoid IM 11/27/2006, 03/15/2010       Social History   I have reviewed this patient's social history and updated it with pertinent information if needed. Thuy Olvera  reports that she has never smoked. She does not have any smokeless tobacco history on file. She reports that she does not drink alcohol.    Family History   I have reviewed this patient's family history and updated it with pertinent information if needed.   No family history on file.    Review of Systems   The 10 point Review of Systems is negative other than noted in the HPI or here.     Physical Exam   Temp: (P) 96.1  F (35.6  C) Temp src: Oral BP: (P) 107/66 Pulse: 69 Heart Rate: (P) 78 Resp: 16 SpO2: (P) 100 % O2 Device: None (Room air)    Vital Signs with Ranges  Temp:  [96.1  F (35.6  C)-97.5  F (36.4  C)] (P) 96.1  F (35.6  C)  Pulse:  [69] 69  Heart Rate:  [71-78] (P) 78  Resp:  [16] 16  BP: ()/(44-77) (P) 107/66  SpO2:  [100 %] (P) 100 %  126 lbs 0 oz  Body mass index is 20.97 kg/m .    GENERAL APPEARANCE:  alert and no distress  EYES: Eyes grossly normal to inspection, PERRL and conjunctivae and sclerae normal  HENT: ear canals and TM's normal and nose and mouth without ulcers or lesions  NECK: no adenopathy, no asymmetry, masses, or scars and thyroid normal to palpation  RESP: lungs clear to auscultation - no rales, rhonchi or wheezes  CV: regular rates and rhythm, normal S1 S2, no S3 or S4 and no murmur, click or rub  LYMPHATICS: normal ant/post cervical and supraclavicular nodes  ABDOMEN: soft, nontender, without hepatosplenomegaly or masses and bowel sounds normal  MS: extremities normal- no gross deformities noted  SKIN: contact  dermatitis of the groin, buttocks       Data   Lab Results   Component Value Date    WBC 6.8 05/27/2019    HGB 8.1 (L) 05/27/2019    HCT 28.7 (L) 05/27/2019     05/27/2019     05/27/2019    POTASSIUM 4.0 05/27/2019    CHLORIDE 107 05/27/2019    CO2 28 05/27/2019    BUN 10 05/27/2019    CR 0.59 05/27/2019    GLC 71 05/27/2019    DD 0.2 12/12/2010    TROPI <0.012 12/12/2010    AST 25 05/27/2019    ALT 52 (H) 05/27/2019    ALKPHOS 67 05/27/2019    BILITOTAL 0.2 05/27/2019    INR 1.09 12/13/2010     No results for input(s): CULT in the last 168 hours.  No lab results found.    Invalid input(s): UC    Amount of time performed on this consult: 45 minutes. This includes face to face assessment and care coordination with the primary team.

## 2019-05-30 NOTE — DISCHARGE SUMMARY
[facility]  Discharge Summary        Thuy Olvera MRN# [id number]   YOB: 1975 Age: 43 year old     Date of Admission:  5/27/2019  Date of Discharge:  5/30/2019  Admitting Physician:  Camden Milner MD  Discharge Physician: Odette Holliday MD  Discharging Service: Hospitalist     Primary Provider: Jesús Escoto  Primary Care Physician Phone Number: [phone]         Discharge Diagnoses/Problem Oriented Hospital Course (Providers):    Thuy Olvera was admitted on 5/27/2019 by Camden Milner MD and I would refer you to their history and physical.  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      1.  Rash involving lower abdomen, groin/inner thighs and buttock region, possibly cellulitis, fungal rash/dermatitis or contact dermatitis.  The patient was given a dose of Zosyn.  Rash already better, per patient's mother.  We will continue antibiotics which will consist of cefazolin intravenously.  We will order topical 1% Clotrimazole.    ID consulted and thought most likley rash form contact dermatitis. Recommended KEFLEX for 3days on discharge   PT left AMA on 5/28     2.  Anemia, chronic.  She has history of iron deficiency and has received IV iron in the past.  Of note, hemoglobin was 6.5 on 05/14/2019 before iron infusions.  No signs of any active major bleeding at this time.  Monitor CBC and monitor for any signs of bleeding.  Otherwise, the patient should follow up with primary provider.     3.  Constipation with abdominal pain.  CT abdomen and pelvis as above showed significant amount of stool.  She does feel that she does have issues with  constipation chronically.  We will order Bisacodyl suppository.  Will schedule MiraLax and Senokot.  Encouraged ambulation and adequate oral hydration.      4.  Diabetes mellitus type 2 without complications.  Continue metformin.        5.  Seizure disorder.  Continue gabapentin, lamotrigine.       6.  Depression/anxiety.  Continue quetiapine, sertraline and trazodone.      7.  Prophylaxis:  Pneumoboots and ambulation.      CODE STATUS:  Full code.      Dispo: pt left AMA to home     Odette Holliday MD         Code Status:      Full Code        Brief Hospital Stay Summary Sent Home With Patient in AVS:        Reason for your hospital stay      Contact dermatitis                 Important Results:      See below         Pending Results:        Unresulted Labs Ordered in the Past 30 Days of this Admission     No orders found from 3/28/2019 to 5/28/2019.            Discharge Instructions and Follow-Up:      Follow-up Appointments     Follow-up and recommended labs and tests       Follow up with primary care provider, Jesús Escoto, within 3-4 days   for hospital follow- up.  No follow up labs or test are needed.               Discharge Disposition:      Discharged to home        Discharge Medications:        Discharge Medication List as of 5/28/2019 12:55 PM      START taking these medications    Details   cephALEXin (KEFLEX) 500 MG capsule Take 1 capsule (500 mg) by mouth every 6 hours for 3 days, Disp-12 capsule, R-0, E-Prescribe      clotrimazole (LOTRIMIN) 1 % external cream Apply topically 2 times dailyDisp-60 g, [id number]      hydrocortisone (CORTAID) 1 % external cream Apply topically 2 times dailyDisp-120 g, [id number]      miconazole (MICATIN/MICRO GUARD) 2 % external powder Apply topically 2 times dailyDisp-70 g, [id number]         CONTINUE these medications which have NOT CHANGED    Details   acetaminophen (TYLENOL) 325 MG tablet Take 325-650 mg by mouth every 6 hours as needed for mild pain,  Historical      calcium carbonate 600 mg-vitamin D 400 units (CALTRATE) 600-400 MG-UNIT per tablet Take 1 tablet by mouth daily, Historical      carbidopa-levodopa (SINEMET)  MG tablet Take 0.5 tablets by mouth 2 times daily, Historical      coal tar-salicylic acid (IONIL-T) 1 % external shampoo Apply topically twice a week Alternate with nizoral shampooHistorical      cyanocobalamin (CYANOCOBALAMIN) 1000 MCG/ML injection Inject 1 mL into the muscle Daily X 7 days, then weekly, Historical      Emollient (CERAVE) CREA Externally apply topically 4 times daily as needed Barrier cream, to faceHistorical      escitalopram (LEXAPRO) 10 MG tablet Take 10 mg by mouth 2 times daily, Historical      folic acid (FOLVITE) 400 MCG tablet Take 400 mcg by mouth daily, Historical      gabapentin (NEURONTIN) 300 MG capsule Take 300 mg by mouth 2 times daily, Historical      ketoconazole (NIZORAL) 2 % external shampoo Apply topically twice a week Alternate with coal tar shampooHistorical      metFORMIN (GLUCOPHAGE) 500 MG tablet Take 500 mg by mouth 2 times daily (with meals), Historical      polyethylene glycol (MIRALAX/GLYCOLAX) packet Take 1 packet by mouth 2 times daily as needed for constipation, Historical      QUEtiapine (SEROQUEL) 25 MG tablet Take 6.25 mg by mouth 2 times daily AM and noon, Historical      QUEtiapine ER (SEROQUEL XR) 200 MG 24 hr tablet Take 200 mg by mouth At Bedtime, Historical      rizatriptan (MAXALT) 5 MG tablet Take 5 mg by mouth at onset of headache for migraine (may repeat X1), Historical      rosuvastatin (CRESTOR) 5 MG tablet Take 5 mg by mouth At Bedtime, Historical      zonisamide (ZONEGRAN) 100 MG capsule Take by mouth 2 times daily , Historical         STOP taking these medications       Neomycin-Bacitracin-Polymyxin (TRIPLE ANTIBIOTIC) 3.5-400-5000 OINT ointment Comments:   Reason for Stopping:                 Allergies:         Allergies   Allergen Reactions     Nsaids      Had a  "gastric bypass, told not to take any nsaids.     Tramadol Other (See Comments)     Seizures           Consultations This Hospital Stay:      Consultation during this admission received from infectious disease        Condition and Physical on Discharge:      Discharge condition: Stable   Vitals: Blood pressure (P) 107/66, pulse 69, temperature (P) 96.1  F (35.6  C), resp. rate 16, height 1.651 m (5' 5\"), weight 57.2 kg (126 lb), SpO2 (P) 100 %.     Constitutional: Alert and awake    Lungs: CTA   Cardiovascular: RRR   Abdomen: Soft, NT, ND, BS+   Skin: Rash on perinal area and groins and buttocks    Other:          Discharge Time:      Greater than 30 minutes.        Image Results From This Hospital Stay (For Non-EPIC Providers):        Results for orders placed or performed during the hospital encounter of 05/27/19   CT Abdomen Pelvis w Contrast    Narrative    CT ABDOMEN PELVIS W CONTRAST  5/28/2019 12:22 AM     HISTORY: Periumbilical, vulvar, buttock, proximal thigh and perineal  pain. Erythema. Cellulitis.    TECHNIQUE: CT abdomen and pelvis with 64 mL Isovue-370 IV. Radiation  dose for this scan was reduced using automated exposure control,  adjustment of the mA and/or kV according to patient size, or iterative  reconstruction technique.    COMPARISON: 11/6/2006.    FINDINGS:  Abdomen: There is mild dependent atelectasis at the lung bases. The  heart size is normal. There are postoperative changes about the  stomach. There is a 1.3 cm indeterminant lesion in the right lobe of  the liver just superior to the gallbladder fossa. The gallbladder is  distended but otherwise appears normal. The spleen, pancreas and  adrenal glands are normal in appearance. There is mild dilatation of  the renal collecting systems bilaterally. There is a large stone in  the left renal pelvis measuring up to 2.3 cm. There are a few smaller  stones in the lower pole of the left kidney. The urinary bladder is  very distended. There is no " abdominal or pelvic lymph node  enlargement.    Pelvis: There is a very large amount of stool throughout the colon. No  bowel obstruction. Small bowel is within normal limits. The uterus  appears grossly normal. No adnexal mass. No free intraperitoneal gas  or fluid.      Impression    IMPRESSION:  1. There is a very large amount of stool throughout the colon. No  bowel obstruction or evidence of bowel inflammation.  2. A few left renal stones including a 2.3 cm renal pelvis stone.  3. Mild bilateral hydronephrosis may be due to the very distended  urinary bladder.    TANYA PENA MD             Most Recent Lab Results In EPIC (For Non-EPIC Providers):    Most Recent 3 CBC's:  Recent Labs   Lab Test 05/27/19 2307 04/27/16  0802 05/28/14  1800   WBC 6.8 10.1 7.1   HGB 8.1* 12.7 11.9   MCV 62* 82 82    450 325      Most Recent 3 BMP's:  Recent Labs   Lab Test 05/27/19 2307 04/27/16  0802 05/28/14  1800    137 138   POTASSIUM 4.0 4.5 4.1   CHLORIDE 107 104 105   CO2 28 25 20   BUN 10 7 4*   CR 0.59 0.51* 0.56   ANIONGAP 5 8 13   NIURKA 8.5 9.0 8.5   GLC 71 149* 112*     Most Recent 3 Troponin's:No lab results found.    Invalid input(s): TROP, TROPONINIES  Most Recent 3 INR's:No lab results found.  Most Recent 2 LFT's:  Recent Labs   Lab Test 05/27/19 2307 04/27/16  0802   AST 25 9   ALT 52* 22   ALKPHOS 67 67   BILITOTAL 0.2 0.5     Most Recent Cholesterol Panel:No lab results found.  Most Recent 6 Bacteria Isolates From Any Culture (See EPIC Reports for Culture Details):No lab results found.  Most Recent TSH, T4 and HgbA1c:   Recent Labs   Lab Test 04/27/16  0802   TSH 4.01*   T4 0.87